# Patient Record
Sex: MALE | Race: BLACK OR AFRICAN AMERICAN | URBAN - METROPOLITAN AREA
[De-identification: names, ages, dates, MRNs, and addresses within clinical notes are randomized per-mention and may not be internally consistent; named-entity substitution may affect disease eponyms.]

---

## 2017-12-29 ENCOUNTER — OFFICE VISIT (OUTPATIENT)
Dept: UROLOGY | Age: 52
End: 2017-12-29

## 2017-12-29 VITALS
SYSTOLIC BLOOD PRESSURE: 133 MMHG | TEMPERATURE: 98.2 F | DIASTOLIC BLOOD PRESSURE: 81 MMHG | WEIGHT: 207 LBS | OXYGEN SATURATION: 96 % | BODY MASS INDEX: 28.98 KG/M2 | HEIGHT: 71 IN | HEART RATE: 84 BPM

## 2017-12-29 DIAGNOSIS — R35.1 NOCTURIA: Primary | ICD-10-CM

## 2017-12-29 LAB
BILIRUB UR QL STRIP: NEGATIVE
GLUCOSE UR-MCNC: NEGATIVE MG/DL
KETONES P FAST UR STRIP-MCNC: NEGATIVE MG/DL
PH UR STRIP: 5.5 [PH] (ref 4.6–8)
PROT UR QL STRIP: NEGATIVE
SP GR UR STRIP: 1.03 (ref 1–1.03)
UA UROBILINOGEN AMB POC: NORMAL (ref 0.2–1)
URINALYSIS CLARITY POC: CLEAR
URINALYSIS COLOR POC: YELLOW
URINE BLOOD POC: NORMAL
URINE LEUKOCYTES POC: NEGATIVE
URINE NITRITES POC: NEGATIVE

## 2017-12-29 RX ORDER — HYDROCORTISONE 2.5% 25 MG/G
CREAM TOPICAL
COMMUNITY
Start: 2017-12-18

## 2017-12-29 RX ORDER — OXYBUTYNIN CHLORIDE 10 MG/1
TABLET, EXTENDED RELEASE ORAL
COMMUNITY
Start: 2017-12-18

## 2017-12-29 RX ORDER — POTASSIUM CHLORIDE 750 MG/1
TABLET, EXTENDED RELEASE ORAL
Qty: 30 TAB | Refills: 6 | Status: SHIPPED | OUTPATIENT
Start: 2017-12-29

## 2017-12-29 RX ORDER — PANTOPRAZOLE SODIUM 40 MG/1
TABLET, DELAYED RELEASE ORAL
COMMUNITY
Start: 2017-12-04

## 2017-12-29 RX ORDER — TAMSULOSIN HYDROCHLORIDE 0.4 MG/1
CAPSULE ORAL
COMMUNITY
Start: 2017-12-18

## 2017-12-29 RX ORDER — IBUPROFEN 800 MG/1
800 TABLET ORAL
COMMUNITY

## 2017-12-29 RX ORDER — FUROSEMIDE 20 MG/1
TABLET ORAL
Qty: 30 TAB | Refills: 6 | Status: SHIPPED | OUTPATIENT
Start: 2017-12-29

## 2017-12-29 NOTE — PROGRESS NOTES
Yaron Gutierrez    Chief Complaint   Patient presents with   [facility] New Patient    Nocturia     3-4 times at night       History and Physical    Vision is a pleasant 50-year-old -American male who advises that he has a 15 year history of nocturnal frequency. The nocturia is 3 or 4 times at night. The volume is essentially equivalent to a daytime void. The patient denies any nighttime consumption of large amounts of fluid or of caffeine consumption for supper. He does not drink alcohol on a nightly basis. The patient was given oxybutynin to take and it is the extended dose and he consumes it in the morning. This is been lysed for about a year. The patient has not noticed a change in the nocturnal frequency but he does notice that at night times only the stream is has an intermittent but otherwise unchanged. The patient has noticed some post void dribbling both nighttime and daytime and is now wearing a pad for this purpose. The patient denies any lower extremity swelling. The patient was recently given a prescription for Flomax but has not yet begun consuming it on a regular basis. There is no history of  injury surgery or instrumentation. The patient denies any other irritative or obstructive symptoms. The patient states that he is getting routine digital rectal exam and PSA done through another facility and it has been normal.  There is no family history of prostate issues. Past Medical History:   Diagnosis Date    Hemorrhoid     Urinary frequency      There is no problem list on file for this patient. History reviewed. No pertinent surgical history.   Current Outpatient Prescriptions   Medication Sig Dispense Refill    tamsulosin (FLOMAX) 0.4 mg capsule       pantoprazole (PROTONIX) 40 mg tablet       oxybutynin chloride XL (DITROPAN XL) 10 mg CR tablet       DOC-Q-LACE 100 mg capsule       PROCTOZONE-HC 2.5 % rectal cream       ibuprofen (MOTRIN) 800 mg tablet 800 mg.     [facility] ESOMEPRAZOLE MAGNESIUM (NEXIUM PO) Take  by Mouth.  CETIRIZINE HCL (ZYRTEC PO) Take  by Mouth. No Known Allergies  Social History     Social History    Marital status:      Spouse name: N/A    Number of children: N/A    Years of education: N/A     Occupational History    Not on file. Social History Main Topics    Smoking status: Never Smoker    Smokeless tobacco: Never Used    Alcohol use 0.6 oz/week     1 Glasses of wine per week    Drug use: Not on file    Sexual activity: Not on file     Other Topics Concern    Not on file     Social History Narrative    No narrative on file      History reviewed. No pertinent family history. Visit Vitals    /81 (BP 1 Location: Left arm, BP Patient Position: Sitting)    Pulse 84    Temp 98.2 °F (36.8 °C)    Ht 5' 11\" (1.803 m)    Wt 207 lb (93.9 kg)    SpO2 96%    BMI 28.87 kg/m2     Physical        Gen: WDWN adult NAD  Head  : normocephalic,  Normal ROM; eyes without normal pupils, EOMs, no masses;  conjunctiva normal  Neck: normal movement,  no evident mass,  No evident adenopathy, trachea midline,  Lungs clear to auscultation with no rales or ronchi or rubs  Cardiac NSR with no murmur, rub, extra sounds  Abd :bowel sounds normal, no masses, tenderness, organomegaly  Flanks  negative  -penis circumcised and normal with normal meatus. Scrotal contents normal with normal size and texture of testes. Rectal tone is normal.  Prostate is 30 g and benign    Extremities-there is a mild trace of lower extremity pitting edema  Psych- oriented, no evident anxiety, no cognitive impairment evident    Urine is trace positive for blood but I see only an occasional red blood cell per high-powered field  Bladder scan reveals 41 cc                      Impression/ PLAN  The patient has nocturia up to 3 times a night with volumes that are equivalent to a daytime void.   This has not been improved with the use of anticholinergic medications but the patient has developed some slowing of his urine at night and remittent flow that is consistent with anticholinergic impact. The addition of Flomax might help alleviate the nighttime issues of intermittent and hesitant urine flow but will not address the nocturnal frequency. The post void dribbling is due to bulbar urethral trapping of urine and perineal elevation is advised. I discussed this with the patient and what I would like to do is give him some afternoon Lasix and fluid restriction at suppertime to assess the effect. If this fails then we could consider DDAVP            This visit exceeded 30 minutes and >50% was counselling  The patient understands the discussion and plan    PLEASE NOTE:      This document has been produced using voice recognition software.   Unrecognized errors in transcription may be present    Diann Valles MD

## 2017-12-29 NOTE — PATIENT INSTRUCTIONS
Frequent Urination: Care Instructions  Your Care Instructions  An urge to urinate frequently but usually passing only small amounts of urine is a common symptom of urinary problems, such as urinary tract infections. The bladder may become inflamed. This can cause the urge to urinate. You may try to urinate more often than usual to try to soothe that urge. Frequent urination also may be caused by sexually transmitted infections (STIs) or kidney stones. Or it may happen when something irritates the tube that carries urine from the bladder to the outside of the body (urethra). It may also be a sign of diabetes. The cause may be hard to find. You may need tests. Follow-up care is a key part of your treatment and safety. Be sure to make and go to all appointments, and call your doctor if you are having problems. It's also a good idea to know your test results and keep a list of the medicines you take. How can you care for yourself at home? · Drink extra water for the next day or two. This will help make the urine less concentrated. (If you have kidney, heart, or liver disease and have to limit fluids, talk with your doctor before you increase the amount of fluids you drink.)  · Avoid drinks that are carbonated or have caffeine. They can irritate the bladder. For women:  · Urinate right after you have sex. · After you go to the bathroom, wipe from front to back. · Avoid douches, bubble baths, and feminine hygiene sprays. And avoid other feminine hygiene products that have deodorants. When should you call for help? Call your doctor now or seek immediate medical care if:  ? · You have new symptoms, such as fever, nausea, or vomiting. ? · You have new or worse symptoms of a urinary problem. For example:  ¨ You have blood or pus in your urine. ¨ You have chills or body aches. ¨ It hurts to urinate. ¨ You have groin or belly pain. ¨ You have pain in your back just below your rib cage (the flank area). ?Watch closely for changes in your health, and be sure to contact your doctor if you feel thirstier than usual.  Where can you learn more? Go to http://shantel-ana.info/. Enter 286 3651 in the search box to learn more about \"Frequent Urination: Care Instructions. \"  Current as of: May 12, 2017  Content Version: 11.4  © 8163-7364 [employer]. Care instructions adapted under license by [employer] (which disclaims liability or warranty for this information). If you have questions about a medical condition or this instruction, always ask your healthcare professional. [place] [address] any warranty or liability for your use of this information.

## 2017-12-29 NOTE — PROGRESS NOTES
Mr. Maria Elena Starr has a reminder for a \"due or due soon\" health maintenance. I have asked that he contact his primary care provider for follow-up on this health maintenance.

## 2017-12-29 NOTE — MR AVS SNAPSHOT
Visit Information Date & Time Provider Department Dept. Phone Encounter #  
 12/29/2017  2:15 PM Cassy Lott, [facility] [phone] Follow-up Instructions Return if symptoms worsen or fail to improve. Follow-up and Disposition History Upcoming Health Maintenance Date Due DTaP/Tdap/Td series (1 - Tdap) 5/29/1987 FOBT Q 1 YEAR AGE 50-75 5/29/2016 Influenza Age 5 to Adult 8/1/2017 Allergies as of 12/29/2017  Review Complete On: 12/29/2017 By: Cassy Lott MD  
 No Known Allergies Current Immunizations  Never Reviewed No immunizations on file. Not reviewed this visit You Were Diagnosed With   
  
 Codes Comments Nocturia    -  Primary ICD-10-CM: R35.1 ICD-9-CM: 788.43 Vitals BP Pulse Temp Height(growth percentile) Weight(growth percentile) SpO2  
 133/81 (BP 1 Location: Left arm, BP Patient Position: Sitting) 84 98.2 °F (36.8 °C) 5' 11\" (1.803 m) 207 lb (93.9 kg) 96% BMI Smoking Status 28.87 kg/m2 Never Smoker BMI and BSA Data Body Mass Index Body Surface Area  
 28.87 kg/m 2 2.17 m 2 Preferred Pharmacy Pharmacy Name Phone [facility], [address] [phone] Your Updated Medication List  
  
   
This list is accurate as of: 12/29/17  3:11 PM.  Always use your most recent med list.  
  
  
  
  
 DOC-Q-LACE 100 mg capsule Generic drug:  docusate sodium  
  
 furosemide 20 mg tablet Commonly known as:  LASIX One tab 6 hours before bedtime  
  
 ibuprofen 800 mg tablet Commonly known as:  MOTRIN  
800 mg. NEXIUM PO Take  by Mouth. oxybutynin chloride XL 10 mg CR tablet Commonly known as:  DITROPAN XL  
  
 pantoprazole 40 mg tablet Commonly known as:  PROTONIX  
  
 potassium chloride 10 mEq tablet Commonly known as:  KLOR-CON  
 One tab 6 hours before bedtime PROCTOZONE-HC 2.5 % rectal cream  
Generic drug:  hydrocortisone  
  
 tamsulosin 0.4 mg capsule Commonly known as:  FLOMAX ZYRTEC PO Take  by Mouth. Prescriptions Sent to Pharmacy Refills  
 furosemide (LASIX) 20 mg tablet 6 Sig: One tab 6 hours before bedtime Class: Normal  
 Pharmacy: [facility] [address] Ph #: [phone]  
 potassium chloride (KLOR-CON) 10 mEq tablet 6 Sig: One tab 6 hours before bedtime Class: Normal  
 Pharmacy: [facility], [address] [place] [address] [address] [facility] Ph #: [phone] We Performed the Following AMB POC URINALYSIS DIP STICK AUTO W/O MICRO [78722 CPT(R)] [place] TEJINDER,POST-VOID RES,US,NON-IMAGING [id number] CPT(R)] Follow-up Instructions Return if symptoms worsen or fail to improve. Patient Instructions Frequent Urination: Care Instructions Your Care Instructions An urge to urinate frequently but usually passing only small amounts of urine is a common symptom of urinary problems, such as urinary tract infections. The bladder may become inflamed. This can cause the urge to urinate. You may try to urinate more often than usual to try to soothe that urge. Frequent urination also may be caused by sexually transmitted infections (STIs) or kidney stones. Or it may happen when something irritates the tube that carries urine from the bladder to the outside of the body (urethra). It may also be a sign of diabetes. The cause may be hard to find. You may need tests. Follow-up care is a key part of your treatment and safety. Be sure to make and go to all appointments, and call your doctor if you are having problems. It's also a good idea to know your test results and keep a list of the medicines you take. How can you care for yourself at home? · Drink extra water for the next day or two. This will help make the urine less concentrated. (If you have kidney, heart, or liver disease and have to limit fluids, talk with your doctor before you increase the amount of fluids you drink.) · Avoid drinks that are carbonated or have caffeine. They can irritate the bladder. For women: · Urinate right after you have sex. · After you go to the bathroom, wipe from front to back. · Avoid douches, bubble baths, and feminine hygiene sprays. And avoid other feminine hygiene products that have deodorants. When should you call for help? Call your doctor now or seek immediate medical care if: 
? · You have new symptoms, such as fever, nausea, or vomiting. ? · You have new or worse symptoms of a urinary problem. For example: ¨ You have blood or pus in your urine. ¨ You have chills or body aches. ¨ It hurts to urinate. ¨ You have groin or belly pain. ¨ You have pain in your back just below your rib cage (the flank area). ? Watch closely for changes in your health, and be sure to contact your doctor if you feel thirstier than usual. 
Where can you learn more? Go to http://shantel-ana.info/. Enter 492 4353 in the search box to learn more about \"Frequent Urination: Care Instructions. \" Current as of: May 12, 2017 Content Version: 11.4 © 1639-6159 [employer]. Care instructions adapted under license by [employer] (which disclaims liability or warranty for this information). If you have questions about a medical condition or this instruction, always ask your healthcare professional. [address] any warranty or liability for your use of this information. Patient Instructions History Introducing [facility]! Jenni Cadet introduces [facility] patient portal. Now you can access parts of your medical record, email your doctor's office, and request medication refills online. 1. In your internet browser, go to [email or web address] 2. Click on the First Time User? Click Here link in the Sign In box. You will see the New Member Sign Up page. 3. Enter your [employer] Access Code exactly as it appears below. You will not need to use this code after youve completed the sign-up process. If you do not sign up before the expiration date, you must request a new code. · [employer] Access Code: [email or web address] Expires: 3/29/2018  2:03 PM 
 
4. Enter the last four digits of your Social Security Number (xxxx) and Date of Birth (mm/dd/yyyy) as indicated and click Submit. You will be taken to the next sign-up page. 5. Create a [employer] ID. This will be your [employer] login ID and cannot be changed, so think of one that is secure and easy to remember. 6. Create a [employer] password. You can change your password at any time. 7. Enter your Password Reset Question and Answer. This can be used at a later time if you forget your password. 8. Enter your e-mail address. You will receive e-mail notification when new information is available in [address] 9. Click Sign Up. You can now view and download portions of your medical record. 10. Click the Download Summary menu link to download a portable copy of your medical information. If you have questions, please visit the Frequently Asked Questions section of the [employer] website. Remember, [employer] is NOT to be used for urgent needs. For medical emergencies, dial 911. Now available from your iPhone and Android! Please provide this summary of care documentation to your next provider. If you have any questions after today's visit, please call [phone].